# Patient Record
Sex: MALE | Race: WHITE | URBAN - METROPOLITAN AREA
[De-identification: names, ages, dates, MRNs, and addresses within clinical notes are randomized per-mention and may not be internally consistent; named-entity substitution may affect disease eponyms.]

---

## 2023-08-16 ENCOUNTER — HOSPITAL ENCOUNTER (EMERGENCY)
Age: 43
Discharge: HOME | End: 2023-08-16
Attending: EMERGENCY MEDICINE

## 2023-08-16 ENCOUNTER — APPOINTMENT (OUTPATIENT)
Dept: CT IMAGING | Age: 43
End: 2023-08-16
Attending: EMERGENCY MEDICINE

## 2023-08-16 VITALS
RESPIRATION RATE: 16 BRPM | HEART RATE: 89 BPM | TEMPERATURE: 98.3 F | DIASTOLIC BLOOD PRESSURE: 71 MMHG | OXYGEN SATURATION: 97 % | SYSTOLIC BLOOD PRESSURE: 126 MMHG

## 2023-08-16 DIAGNOSIS — M26.30: ICD-10-CM

## 2023-08-16 DIAGNOSIS — Z91.030 BEE STING ALLERGY: ICD-10-CM

## 2023-08-16 DIAGNOSIS — S00.83XA CONTUSION OF FACE, INITIAL ENCOUNTER: ICD-10-CM

## 2023-08-16 DIAGNOSIS — T78.40XA ALLERGIC REACTION, INITIAL ENCOUNTER: Primary | ICD-10-CM

## 2023-08-16 PROCEDURE — 99284 EMERGENCY DEPT VISIT MOD MDM: CPT

## 2023-08-16 PROCEDURE — 70450 CT HEAD/BRAIN W/O DYE: CPT

## 2023-08-16 PROCEDURE — 6370000000 HC RX 637 (ALT 250 FOR IP): Performed by: EMERGENCY MEDICINE

## 2023-08-16 PROCEDURE — 70486 CT MAXILLOFACIAL W/O DYE: CPT

## 2023-08-16 RX ORDER — PREDNISONE 20 MG/1
40 TABLET ORAL DAILY
Qty: 8 TABLET | Refills: 0 | Status: SHIPPED | OUTPATIENT
Start: 2023-08-16 | End: 2023-08-20

## 2023-08-16 RX ORDER — EPINEPHRINE 0.3 MG/.3ML
0.3 INJECTION SUBCUTANEOUS ONCE
Qty: 1 EACH | Refills: 0 | Status: SHIPPED | OUTPATIENT
Start: 2023-08-16 | End: 2023-08-16

## 2023-08-16 RX ORDER — FAMOTIDINE 20 MG/1
20 TABLET, FILM COATED ORAL 2 TIMES DAILY
Qty: 20 TABLET | Refills: 0 | Status: SHIPPED | OUTPATIENT
Start: 2023-08-16 | End: 2023-08-26

## 2023-08-16 RX ORDER — CETIRIZINE HYDROCHLORIDE 10 MG/1
10 TABLET ORAL ONCE
Status: COMPLETED | OUTPATIENT
Start: 2023-08-16 | End: 2023-08-16

## 2023-08-16 RX ORDER — FAMOTIDINE 20 MG/1
20 TABLET, FILM COATED ORAL ONCE
Status: COMPLETED | OUTPATIENT
Start: 2023-08-16 | End: 2023-08-16

## 2023-08-16 RX ORDER — PREDNISONE 20 MG/1
60 TABLET ORAL ONCE
Status: COMPLETED | OUTPATIENT
Start: 2023-08-16 | End: 2023-08-16

## 2023-08-16 RX ADMIN — CETIRIZINE HYDROCHLORIDE 10 MG: 10 TABLET, FILM COATED ORAL at 19:02

## 2023-08-16 RX ADMIN — PREDNISONE 60 MG: 20 TABLET ORAL at 21:57

## 2023-08-16 RX ADMIN — FAMOTIDINE 20 MG: 20 TABLET, FILM COATED ORAL at 19:02

## 2023-08-16 ASSESSMENT — PAIN - FUNCTIONAL ASSESSMENT
PAIN_FUNCTIONAL_ASSESSMENT: 0-10
PAIN_FUNCTIONAL_ASSESSMENT: NONE - DENIES PAIN

## 2023-08-16 ASSESSMENT — PAIN SCALES - GENERAL: PAINLEVEL_OUTOF10: 5

## 2023-08-16 NOTE — ED TRIAGE NOTES
Patient to ED with multiple hornet/insect stings. Patient began having SOB and swelling so he went to an urgent care.   Patient was given epi and sent to ED

## 2023-08-16 NOTE — ED PROVIDER NOTES
today        DISCHARGE MEDICATIONS:  New Prescriptions    EPINEPHRINE (EPIPEN 2-BI) 0.3 MG/0.3ML SOAJ INJECTION    Inject 0.3 mLs into the skin once for 1 dose    FAMOTIDINE (PEPCID) 20 MG TABLET    Take 1 tablet by mouth 2 times daily for 10 days    PREDNISONE (DELTASONE) 20 MG TABLET    Take 2 tablets by mouth daily for 4 days     Controlled Substances Monitoring:     No flowsheet data found. (Please note that portions of this note were completed with a voice recognition program.  Efforts were made to edit the dictations but occasionally words are mis-transcribed. )    [id number] DO (electronically signed)  Attending Emergency Physician           Michelle Wilkerson DO  08/16/23 [id number]

## 2023-08-17 NOTE — DISCHARGE INSTRUCTIONS
Follow-up with your dentist as soon as possible regarding dental injury. Call tomorrow for an appointment. Follow-up with your primary care physician in 1 to 2 days for reexamination regarding the bee sting. Avoid all stinging insects such as hornets, yellow jackets, bees, wasps, etc.  You are likely allergic to all of these. He should especially avoid being stung in the near future as subsequent stings could result in more severe symptoms. Always keep your EpiPen with you at all times. Use EpiPen for any serious allergic reaction symptoms such as difficulty breathing, dizziness, shortness of breath, facial swelling, throat swelling, etc.    If you have to use the EpiPen, go to the emergency department immediately even if improvement of symptoms. If you are stung by a bee, seek medical attention immediately in the emergency department. If possible take Benadryl 50 mg p.o. immediately. Do not drive or operate machinery while taking Benadryl. May cause drowsiness. May take Benadryl as directed for any itching or swelling. If condition worsens or new symptoms develop, return immediately to the emergency department.